# Patient Record
Sex: FEMALE | Race: BLACK OR AFRICAN AMERICAN | Employment: FULL TIME | URBAN - METROPOLITAN AREA
[De-identification: names, ages, dates, MRNs, and addresses within clinical notes are randomized per-mention and may not be internally consistent; named-entity substitution may affect disease eponyms.]

---

## 2025-01-08 ENCOUNTER — OFFICE VISIT (OUTPATIENT)
Dept: URGENT CARE | Facility: CLINIC | Age: 35
End: 2025-01-08
Payer: COMMERCIAL

## 2025-01-08 VITALS
TEMPERATURE: 98.7 F | HEART RATE: 94 BPM | SYSTOLIC BLOOD PRESSURE: 138 MMHG | DIASTOLIC BLOOD PRESSURE: 96 MMHG | WEIGHT: 225.6 LBS | OXYGEN SATURATION: 98 % | BODY MASS INDEX: 38.72 KG/M2

## 2025-01-08 DIAGNOSIS — R51.9 ACUTE INTRACTABLE HEADACHE, UNSPECIFIED HEADACHE TYPE: Primary | ICD-10-CM

## 2025-01-08 DIAGNOSIS — L73.2 HIDRADENITIS SUPPURATIVA OF MULTIPLE SITES: ICD-10-CM

## 2025-01-08 PROBLEM — H10.9 CONJUNCTIVITIS: Status: RESOLVED | Noted: 2025-01-08 | Resolved: 2025-01-08

## 2025-01-08 PROBLEM — D57.3 SICKLE CELL TRAIT (CMS-HCC): Status: ACTIVE | Noted: 2019-04-23

## 2025-01-08 PROBLEM — O99.210 OBESITY AFFECTING PREGNANCY, ANTEPARTUM (HHS-HCC): Status: RESOLVED | Noted: 2019-04-09 | Resolved: 2025-01-08

## 2025-01-08 PROBLEM — O09.899 SUSCEPTIBLE TO VARICELLA (NON-IMMUNE), CURRENTLY PREGNANT (HHS-HCC): Status: ACTIVE | Noted: 2019-04-30

## 2025-01-08 PROBLEM — Z28.39 SUSCEPTIBLE TO VARICELLA (NON-IMMUNE), CURRENTLY PREGNANT (HHS-HCC): Status: ACTIVE | Noted: 2019-04-30

## 2025-01-08 PROBLEM — G43.909 MIGRAINE HEADACHE: Status: ACTIVE | Noted: 2025-01-08

## 2025-01-08 PROBLEM — O34.219 PREVIOUS CESAREAN DELIVERY AFFECTING PREGNANCY (HHS-HCC): Status: RESOLVED | Noted: 2019-04-09 | Resolved: 2025-01-08

## 2025-01-08 PROBLEM — O13.9 GESTATIONAL HYPERTENSION, ANTEPARTUM (HHS-HCC): Status: RESOLVED | Noted: 2019-09-16 | Resolved: 2025-01-08

## 2025-01-08 PROBLEM — J06.9 URI (UPPER RESPIRATORY INFECTION): Status: ACTIVE | Noted: 2025-01-08

## 2025-01-08 PROBLEM — K04.7 DENTAL INFECTION: Status: RESOLVED | Noted: 2025-01-08 | Resolved: 2025-01-08

## 2025-01-08 PROBLEM — O99.019 ANEMIA AFFECTING PREGNANCY (HHS-HCC): Status: RESOLVED | Noted: 2019-04-16 | Resolved: 2025-01-08

## 2025-01-08 PROBLEM — J20.9 ACUTE BRONCHITIS: Status: RESOLVED | Noted: 2025-01-08 | Resolved: 2025-01-08

## 2025-01-08 PROCEDURE — 3080F DIAST BP >= 90 MM HG: CPT

## 2025-01-08 PROCEDURE — 3075F SYST BP GE 130 - 139MM HG: CPT

## 2025-01-08 PROCEDURE — 99203 OFFICE O/P NEW LOW 30 MIN: CPT

## 2025-01-08 RX ORDER — ONDANSETRON 4 MG/1
TABLET, FILM COATED ORAL
COMMUNITY
Start: 2021-02-19

## 2025-01-08 RX ORDER — CLINDAMYCIN PHOSPHATE 10 MG/G
GEL TOPICAL
Qty: 60 G | Refills: 0 | Status: SHIPPED | OUTPATIENT
Start: 2025-01-08 | End: 2026-01-08

## 2025-01-08 RX ORDER — SULFAMETHOXAZOLE AND TRIMETHOPRIM 800; 160 MG/1; MG/1
1 TABLET ORAL 2 TIMES DAILY
Qty: 20 TABLET | Refills: 0 | Status: SHIPPED | OUTPATIENT
Start: 2025-01-08 | End: 2025-01-18

## 2025-01-08 RX ORDER — CHLORHEXIDINE GLUCONATE 40 MG/ML
SOLUTION TOPICAL DAILY PRN
Qty: 473 ML | Refills: 0 | Status: SHIPPED | OUTPATIENT
Start: 2025-01-08 | End: 2025-02-07

## 2025-01-08 RX ORDER — BUTALBITAL, ACETAMINOPHEN AND CAFFEINE 50; 325; 40 MG/1; MG/1; MG/1
1 TABLET ORAL EVERY 6 HOURS PRN
Qty: 20 TABLET | Refills: 0 | Status: SHIPPED | OUTPATIENT
Start: 2025-01-08 | End: 2025-01-13

## 2025-01-08 RX ORDER — BUTALBITAL, ACETAMINOPHEN AND CAFFEINE 50; 325; 40 MG/1; MG/1; MG/1
CAPSULE ORAL EVERY 6 HOURS
COMMUNITY
Start: 2021-02-19

## 2025-01-08 ASSESSMENT — ENCOUNTER SYMPTOMS
CARDIOVASCULAR NEGATIVE: 1
CONSTITUTIONAL NEGATIVE: 1
GASTROINTESTINAL NEGATIVE: 1
HEADACHES: 1
MYALGIAS: 0
SHORTNESS OF BREATH: 0
PALPITATIONS: 0
RHINORRHEA: 0
ARTHRALGIAS: 0
NAUSEA: 0
RESPIRATORY NEGATIVE: 1
DIZZINESS: 0
TROUBLE SWALLOWING: 0
COLOR CHANGE: 0
FACIAL SWELLING: 0
FATIGUE: 0
EYE REDNESS: 0
DIAPHORESIS: 0
CHILLS: 0
COUGH: 0
ABDOMINAL PAIN: 0
SORE THROAT: 0
DIARRHEA: 0
WEAKNESS: 0
WOUND: 1
VOICE CHANGE: 0
FEVER: 0
MUSCULOSKELETAL NEGATIVE: 1
VOMITING: 0

## 2025-01-08 NOTE — PROGRESS NOTES
Subjective   History  Lois Ashford is a 34 y.o. female who presents for Headache.    Patient presents with reports of bad headache that she gets chronically. She reports a history of migraines and HS. She states that the HS has been flaring up badly for about the last 2.5 weeks. Patient reports HS flare worse than previous in her groin and underneath her left breast. She states that it has been improving some with topical remedies of tea tree oil at home. Patient reports trying OTC medications including Acetaminophen/Ibuprofen with minimal relief. denies nausea, photosensitivity, denies vision changes, or tinnitus.       History provided by:  Patient and medical records  Headache  Pain location:  Generalized  Progression:  Waxing and waning  Chronicity:  Chronic  Similar to prior headaches: yes    Context: not activity, not exposure to bright light, not coughing and not loud noise    Associated symptoms: no abdominal pain, no blurred vision, no congestion, no cough, no diarrhea, no dizziness, no ear pain, no eye pain, no fatigue, no fever, no myalgias, no nausea, no near-syncope, no neck pain, no neck stiffness, no photophobia, no sinus pressure, no sore throat, no URI, no visual change, no vomiting and no weakness      No past surgical history on file.       Review of Systems   Review of Systems   Constitutional: Negative.  Negative for chills, diaphoresis, fatigue and fever.   HENT: Negative.  Negative for congestion, ear pain, facial swelling, rhinorrhea, sinus pressure, sore throat, trouble swallowing and voice change.    Eyes:  Negative for blurred vision, photophobia, pain, redness and visual disturbance.   Respiratory: Negative.  Negative for cough and shortness of breath.    Cardiovascular: Negative.  Negative for chest pain, palpitations and near-syncope.   Gastrointestinal: Negative.  Negative for abdominal pain, diarrhea, nausea and vomiting.   Genitourinary:  Negative for genital sores.    Musculoskeletal: Negative.  Negative for arthralgias, myalgias, neck pain and neck stiffness.   Skin:  Positive for rash and wound. Negative for color change.   Neurological:  Positive for headaches. Negative for dizziness, weakness and light-headedness.       Objective   Vital Signs  BP (!) 138/96 (BP Location: Left arm, Patient Position: Sitting)   Pulse 94   Temp 37.1 °C (98.7 °F)   Wt 102 kg (225 lb 9.6 oz)   LMP 12/22/2024   SpO2 98%   BMI 38.72 kg/m²    All vitals have been reviewed and are stable.     Physical Exam  Physical Exam  Vitals and nursing note reviewed.   Constitutional:       General: She is awake. She is not in acute distress.     Appearance: Normal appearance. She is well-developed. She is not ill-appearing, toxic-appearing or diaphoretic.   HENT:      Head: Normocephalic and atraumatic. No right periorbital erythema or left periorbital erythema.      Jaw: There is normal jaw occlusion.      Right Ear: External ear normal.      Left Ear: External ear normal.      Nose: Nose normal. No congestion or rhinorrhea.      Mouth/Throat:      Lips: Pink. No lesions.      Mouth: Mucous membranes are moist. No oral lesions or angioedema.      Pharynx: Oropharynx is clear.   Eyes:      General: Lids are normal. Vision grossly intact. Gaze aligned appropriately.      Extraocular Movements: Extraocular movements intact.      Conjunctiva/sclera: Conjunctivae normal.      Right eye: Right conjunctiva is not injected.      Left eye: Left conjunctiva is not injected.      Pupils: Pupils are equal, round, and reactive to light.   Cardiovascular:      Rate and Rhythm: Normal rate and regular rhythm.   Pulmonary:      Effort: Pulmonary effort is normal. No tachypnea or respiratory distress.      Breath sounds: Normal air entry. No stridor. No wheezing.   Abdominal:      General: Abdomen is flat. There is no distension.      Palpations: Abdomen is soft.   Musculoskeletal:         General: No swelling or  deformity. Normal range of motion.      Cervical back: Full passive range of motion without pain, normal range of motion and neck supple.   Skin:     General: Skin is warm and dry.      Findings: Abscess (multiple) present. No erythema.      Comments: Patient declined exam of groin lesions   Neurological:      General: No focal deficit present.      Mental Status: She is alert and oriented to person, place, and time. Mental status is at baseline.      Cranial Nerves: Cranial nerves 2-12 are intact.      Sensory: Sensation is intact.      Motor: Motor function is intact.      Coordination: Coordination is intact.      Gait: Gait is intact.   Psychiatric:         Mood and Affect: Mood and affect normal.         Speech: Speech normal.         Behavior: Behavior normal. Behavior is cooperative.         Thought Content: Thought content normal.         Judgment: Judgment normal.         Diagnostic Results   No results found for this or any previous visit (from the past 48 hours).    Assessment/Plan   Procedures   N/A    Problem List Items Addressed This Visit    None  Visit Diagnoses       Acute intractable headache, unspecified headache type    -  Primary          Fioricet    Hidradenitis suppurativa of multiple sites        Relevant Medications    clindamycin (Cleocin T) 1 % gel    chlorhexidine (Hibiclens) 4 % external liquid    sulfamethoxazole-trimethoprim (Bactrim DS) 800-160 mg tablet            UC Course  Patient disposition: Home    Red flags for reporting to ER have been reviewed with the patient.    Current diagnosis, any medication changes, and all in-office lab or radiologic results have been reviewed with the patient at the time of the visit.   If symptoms do not improve or worsen, patient is to follow up with PCP or report to the emergency room.   Patient is alert and oriented x3 and non-toxic appearing. Vital signs are stable.   Patient and/or guardian has sufficient decision-making capabilities at this  time and reports understanding and agreement with the treatment plan made through shared decision-making.

## 2025-01-08 NOTE — LETTER
January 8, 2025     Patient: Lois Ashford   YOB: 1990   Date of Visit: 1/8/2025       To Whom It May Concern:    Lois Ashford was seen in my clinic on 1/8/2025 at 4:25 pm. Please excuse Lois for her absence from work 1/8/25 due to medical condition. She may return 1/9/25 if symptoms improved.     If you have any questions or concerns, please don't hesitate to call.         Sincerely,         Gissell Hand PA-C        CC: No Recipients

## 2025-01-08 NOTE — PATIENT INSTRUCTIONS
GROIN ABSCESS / FOLLICULITIS     - BACTRIM was prescribed for oral antibiotic treatment of skin infection    - HIBICLENS (Chlorhexidine) antibiotic soap should be used to wash the skin at every shower   - CLINDAMYCIN gel was prescribed for topical antibiotic treatment - use after showers and before sleep     - Keep wound CLEAN and DRY, covered with gauze or bandaid if needed    - Soapy water or Epsom salt soaks or warm compresses of the abscess may be performed 2-4 times per day to promote drainage and healing   - Once skin heals, neosporin or aquaphor may be used over top wound    - If fever, rash, increased pain, swelling. redness, or red streaks in the skin near the wound site occur, seek emergency care     Antibiotics fight against and reduce all bacteria in your body.  Yeast infections, abdominal pain, and diarrhea are very common side effects of taking antibiotics, as the good bacteria is destroyed. Taking supplemental probiotics or eating probiotic yogurt (Activia, Chobani, Kefir) can help replace the bacteria and restore balance preventing or relieving these side effects.     ** It is very important that you complete the whole prescription of antibiotics to kill all of the bacteria causing illness even if symptoms improve. If any of the targeted bacteria survives and returns to cause infection, it will then be stronger and more resistant to antibiotic medications in the future.     HEADACHE / MIGRAINE     - BUTALBITAL-APAP-CAFFEINE (Fioricet) was prescribed for pain relief - Take 1 every 4 hours as needed, may take 2 at a time if 1 not relieving pain, but no more than 6 in 24 hours     - Recommended rest in a dark room without bright electronics    - May try a warm shower for temporary relief   - Be sure to drink plenty of water (at least 6 12oz water bottles) daily to prevent dehydration headaches   - Newer research has suggested that daily doses of 400 mg Vitamin B2 (Riboflavin) and/or 400-600 mg Magnesium  "oxide daily can help prevent migraine attacks and is safely combined with other medications    - Triggers for migraines can include, but are not limited to: dehydration, interrupted sleep or oversleeping, missed/late medication dosing, caffeine or lack of caffeine if regular consumer, skipped meals, bright lights, drastic changes in weather like storms or pressure changes, head trauma, and sinus congestion/allergies   - There are free apps for your phone like \"Migraine Matt\" which can help you track your migraine frequency, triggers, and relief methods.       - If migraines are frequent, talk to your PCP about preventative or abortive prescription migraine treatments.   "

## 2025-01-16 ASSESSMENT — VISUAL ACUITY: OU: 1

## 2025-01-16 ASSESSMENT — ENCOUNTER SYMPTOMS
NECK PAIN: 0
LIGHT-HEADEDNESS: 0
EYE PAIN: 0
PHOTOPHOBIA: 0
NECK STIFFNESS: 0
VISUAL CHANGE: 0
SINUS PRESSURE: 0
NEAR-SYNCOPE: 0
BLURRED VISION: 0